# Patient Record
Sex: FEMALE | Race: WHITE | NOT HISPANIC OR LATINO | Employment: OTHER | URBAN - METROPOLITAN AREA
[De-identification: names, ages, dates, MRNs, and addresses within clinical notes are randomized per-mention and may not be internally consistent; named-entity substitution may affect disease eponyms.]

---

## 2023-10-04 ENCOUNTER — LAB (OUTPATIENT)
Dept: LAB | Facility: LAB | Age: 73
End: 2023-10-04
Payer: MEDICARE

## 2023-10-04 DIAGNOSIS — E78.00 PURE HYPERCHOLESTEROLEMIA, UNSPECIFIED: Primary | ICD-10-CM

## 2023-10-04 LAB — LDLC SERPL DIRECT ASSAY-MCNC: 70 MG/DL (ref 0–129)

## 2023-10-04 PROCEDURE — 83721 ASSAY OF BLOOD LIPOPROTEIN: CPT

## 2023-10-04 PROCEDURE — 36415 COLL VENOUS BLD VENIPUNCTURE: CPT

## 2024-04-16 ENCOUNTER — LAB (OUTPATIENT)
Dept: LAB | Facility: LAB | Age: 74
End: 2024-04-16
Payer: MEDICARE

## 2024-04-16 DIAGNOSIS — R73.9 HYPERGLYCEMIA, UNSPECIFIED: ICD-10-CM

## 2024-04-16 DIAGNOSIS — E78.00 PURE HYPERCHOLESTEROLEMIA, UNSPECIFIED: Primary | ICD-10-CM

## 2024-04-16 LAB
ALBUMIN SERPL-MCNC: 4.3 G/DL (ref 3.5–5)
ALP BLD-CCNC: 134 U/L (ref 35–125)
ALT SERPL-CCNC: 56 U/L (ref 5–40)
ANION GAP SERPL CALC-SCNC: 12 MMOL/L
AST SERPL-CCNC: 39 U/L (ref 5–40)
BILIRUB SERPL-MCNC: 1.3 MG/DL (ref 0.1–1.2)
BUN SERPL-MCNC: 11 MG/DL (ref 8–25)
CALCIUM SERPL-MCNC: 9.6 MG/DL (ref 8.5–10.4)
CHLORIDE SERPL-SCNC: 103 MMOL/L (ref 97–107)
CO2 SERPL-SCNC: 25 MMOL/L (ref 24–31)
CREAT SERPL-MCNC: 0.9 MG/DL (ref 0.4–1.6)
EGFRCR SERPLBLD CKD-EPI 2021: 68 ML/MIN/1.73M*2
ERYTHROCYTE [DISTWIDTH] IN BLOOD BY AUTOMATED COUNT: 13 % (ref 11.5–14.5)
EST. AVERAGE GLUCOSE BLD GHB EST-MCNC: 114 MG/DL
GLUCOSE SERPL-MCNC: 96 MG/DL (ref 65–99)
HBA1C MFR BLD: 5.6 %
HCT VFR BLD AUTO: 43.2 % (ref 36–46)
HGB BLD-MCNC: 13.8 G/DL (ref 12–16)
MCH RBC QN AUTO: 27.4 PG (ref 26–34)
MCHC RBC AUTO-ENTMCNC: 31.9 G/DL (ref 32–36)
MCV RBC AUTO: 86 FL (ref 80–100)
NRBC BLD-RTO: 0 /100 WBCS (ref 0–0)
PLATELET # BLD AUTO: 284 X10*3/UL (ref 150–450)
POTASSIUM SERPL-SCNC: 4.5 MMOL/L (ref 3.4–5.1)
PROT SERPL-MCNC: 6.6 G/DL (ref 5.9–7.9)
RBC # BLD AUTO: 5.04 X10*6/UL (ref 4–5.2)
SODIUM SERPL-SCNC: 140 MMOL/L (ref 133–145)
WBC # BLD AUTO: 5.2 X10*3/UL (ref 4.4–11.3)

## 2024-04-16 PROCEDURE — 83036 HEMOGLOBIN GLYCOSYLATED A1C: CPT

## 2024-04-16 PROCEDURE — 80053 COMPREHEN METABOLIC PANEL: CPT

## 2024-04-16 PROCEDURE — 36415 COLL VENOUS BLD VENIPUNCTURE: CPT

## 2024-04-16 PROCEDURE — 85027 COMPLETE CBC AUTOMATED: CPT

## 2024-06-04 ENCOUNTER — OFFICE VISIT (OUTPATIENT)
Dept: PRIMARY CARE | Facility: CLINIC | Age: 74
End: 2024-06-04
Payer: MEDICARE

## 2024-06-04 ENCOUNTER — TELEPHONE (OUTPATIENT)
Dept: PRIMARY CARE | Facility: CLINIC | Age: 74
End: 2024-06-04

## 2024-06-04 VITALS
WEIGHT: 195 LBS | DIASTOLIC BLOOD PRESSURE: 84 MMHG | TEMPERATURE: 99.8 F | BODY MASS INDEX: 30.61 KG/M2 | SYSTOLIC BLOOD PRESSURE: 122 MMHG | HEIGHT: 67 IN | HEART RATE: 82 BPM | OXYGEN SATURATION: 97 %

## 2024-06-04 DIAGNOSIS — Z00.00 ROUTINE ADULT HEALTH MAINTENANCE: Primary | ICD-10-CM

## 2024-06-04 DIAGNOSIS — E55.9 VITAMIN D DEFICIENCY: ICD-10-CM

## 2024-06-04 DIAGNOSIS — I47.10 SVT (SUPRAVENTRICULAR TACHYCARDIA) (CMS-HCC): ICD-10-CM

## 2024-06-04 DIAGNOSIS — E78.2 MIXED HYPERLIPIDEMIA: ICD-10-CM

## 2024-06-04 DIAGNOSIS — I10 PRIMARY HYPERTENSION: ICD-10-CM

## 2024-06-04 DIAGNOSIS — R74.8 ELEVATED LIVER ENZYMES: ICD-10-CM

## 2024-06-04 DIAGNOSIS — Z12.31 BREAST CANCER SCREENING BY MAMMOGRAM: ICD-10-CM

## 2024-06-04 DIAGNOSIS — Z12.11 COLON CANCER SCREENING: ICD-10-CM

## 2024-06-04 PROBLEM — N95.1 MENOPAUSAL SYNDROME: Status: ACTIVE | Noted: 2021-01-25

## 2024-06-04 PROCEDURE — 3074F SYST BP LT 130 MM HG: CPT | Performed by: FAMILY MEDICINE

## 2024-06-04 PROCEDURE — G0439 PPPS, SUBSEQ VISIT: HCPCS | Performed by: FAMILY MEDICINE

## 2024-06-04 PROCEDURE — 1036F TOBACCO NON-USER: CPT | Performed by: FAMILY MEDICINE

## 2024-06-04 PROCEDURE — 3079F DIAST BP 80-89 MM HG: CPT | Performed by: FAMILY MEDICINE

## 2024-06-04 PROCEDURE — 1158F ADVNC CARE PLAN TLK DOCD: CPT | Performed by: FAMILY MEDICINE

## 2024-06-04 PROCEDURE — 1126F AMNT PAIN NOTED NONE PRSNT: CPT | Performed by: FAMILY MEDICINE

## 2024-06-04 PROCEDURE — 1123F ACP DISCUSS/DSCN MKR DOCD: CPT | Performed by: FAMILY MEDICINE

## 2024-06-04 PROCEDURE — 1159F MED LIST DOCD IN RCRD: CPT | Performed by: FAMILY MEDICINE

## 2024-06-04 PROCEDURE — 1160F RVW MEDS BY RX/DR IN RCRD: CPT | Performed by: FAMILY MEDICINE

## 2024-06-04 PROCEDURE — 1157F ADVNC CARE PLAN IN RCRD: CPT | Performed by: FAMILY MEDICINE

## 2024-06-04 PROCEDURE — 99215 OFFICE O/P EST HI 40 MIN: CPT | Performed by: FAMILY MEDICINE

## 2024-06-04 RX ORDER — LOSARTAN POTASSIUM 50 MG/1
75 TABLET ORAL DAILY
COMMUNITY

## 2024-06-04 RX ORDER — NAPROXEN SODIUM 220 MG/1
81 TABLET, FILM COATED ORAL
COMMUNITY
Start: 2022-06-08

## 2024-06-04 RX ORDER — FLECAINIDE ACETATE 50 MG/1
50 TABLET ORAL 2 TIMES DAILY
COMMUNITY

## 2024-06-04 RX ORDER — ATORVASTATIN CALCIUM 80 MG/1
80 TABLET, FILM COATED ORAL NIGHTLY
COMMUNITY
Start: 2024-04-25

## 2024-06-04 ASSESSMENT — PATIENT HEALTH QUESTIONNAIRE - PHQ9
2. FEELING DOWN, DEPRESSED OR HOPELESS: NOT AT ALL
SUM OF ALL RESPONSES TO PHQ9 QUESTIONS 1 AND 2: 0
1. LITTLE INTEREST OR PLEASURE IN DOING THINGS: NOT AT ALL

## 2024-06-04 ASSESSMENT — PAIN SCALES - GENERAL: PAINLEVEL: 0-NO PAIN

## 2024-06-04 NOTE — TELEPHONE ENCOUNTER
At recent visit, I did state that if all remains stable, we will plan to continue with annual well exams.  I do think we should keep an eye on the liver enzyme levels to which I would like patient to have repeat blood work in 3 months.  At that time we can additionally check patient's thyroid level and vitamin D to make sure there are no metabolic elements adding to weight issues and to monitor her vitamin level secondary to listed history of prior deficiency.  Please let patient know about this.  We do not have to have an associated appointment but we will reach out once results come our way in 3 months

## 2024-06-04 NOTE — ASSESSMENT & PLAN NOTE
- Blood pressure stable in office today  -Continue with current regimen along with healthy, balanced diet including moderation of salt/caffeine

## 2024-06-04 NOTE — PROGRESS NOTES
Outpatient Visit Note    Chief Complaint   Patient presents with    Annual Exam       HPI:  Laurence Méndez is a 74 y.o. female who presents to the office as a new patient for an annual Medicare Wellness Visit.    Patient was previously established with Dr. Nina Gonzalez.  Chart review notes that patient was most recently evaluated by podiatry secondary to neuroma.  Did have panel blood work recently completed in April including CBC, CMP and A1c which was remarkable for mildly elevated ALT/alk phos levels.    Well Exam:  Overall, they describes their health as good with no reports of recent illness or hospitalization. They state that their diet is fair though she has struggled with weight loss. No reported issues of chest pain, shortness of breath, headaches, vision/hearing changes, abdominal pain, vomiting, diarrhea, melena, hematochezia, constipation or urinary symptoms.    Does have a history of SVT for which she diligently follows with cardiology.  Does have follow-up scheduled for September.  Has been on a stable regimen of losartan, atorvastatin and flecainide.  Denies any chest pain or shortness of breath.  Does report ongoing apprehensions to be on any other type of medication including over-the-counter's as she is worried about creating arrhythmias.    Preventative Health Maintenance:  In regards to preventative health maintenance, last Tdap received in 2020. Flu shot received for past season. Pneumonia vaccination series previously initiated. In regards to CRC screening, no prior screening on file. Shingles vaccination not pursued to date. COVID-19 vaccination series completed with patient currently due for booster. Last Mammogram completed in June 2023.    Advanced directives: Living will//POA    Hearing screen: reports no difficulty with hearing and passes finger rub test bilaterally    Does the patient use opioid medications: No  Name of medication: N/A  If yes, do they take this medicine  appropriately: N/A    How does the patient rate their health status today: good     Cognitive Screen:  AAAx3  to person, place and time: Yes  3 word recall: Apple, Car, Shoe - Immediate recall: Yes        - 5 minutes recall: Yes  Impression: No cognitive deficiency observed during screening or encounter today      Reviewed:   Past Medical History/Allergies:  Yes  Family History:  Yes  Social History:  Yes  Current Medications:  Yes  Vital Signs:  Yes  Advanced Directives:  discussed  Immunizations:  reviewed today  Home Safety:                    Up & Go test > 30 seconds?  No                   Home have rugs; lack grab bars in bathroom; lack handrail on stairs; have poor lighting?  No                   Hearing difficulties?  No  Geriatric Assessment                   ADL areas requiring assistance:  Does not need help with Dressing, Eating, Ambulating, Toileting, Grooming, Hygiene.                    IADL areas requiring assistance:  Does not need help with Shopping, Housework, Accounting, Transportation, Driving.   Medications: reviewed  Current supplements:  Reviewed and recorded.   Other providers: Reviewed and recorded - Current providers and suppliers: Dr. Carreon.       Past Medical History:   Diagnosis Date    Hyperlipidemia     Hypertension         Current Medications  Current Outpatient Medications   Medication Instructions    aspirin 81 mg    atorvastatin (LIPITOR) 80 mg, oral, Nightly    flecainide (TAMBOCOR) 50 mg, oral, 2 times daily    Lactobacil 2/Bifido 11/S.therm (HIGH POTENCY PROBIOTIC ORAL) oral    losartan (COZAAR) 75 mg, oral, Daily        Allergies  Allergies   Allergen Reactions    Penicillins Hives and Swelling    Sulfa (Sulfonamide Antibiotics) Angioedema and Swelling    Diltiazem Swelling    Iodine Other     Heat flushing, pt states her blood was affected    Sulfamethoxazole-Trimethoprim Swelling    Heparin Rash        Immunizations  Immunization History   Administered Date(s)  Administered    Flu vaccine (IIV4), preservative free *Check age/dose* 09/02/2020    Flu vaccine, quadrivalent, high-dose, preservative free, age 65y+ (FLUZONE) 09/12/2022    Influenza, Seasonal, Quadrivalent, Adjuvanted 09/07/2021, 10/10/2023    Influenza, seasonal, injectable 09/02/2020    Moderna SARS-CoV-2 Vaccination 03/02/2021, 03/30/2021    Pfizer Purple Cap SARS-CoV-2 12/01/2021    Pneumococcal conjugate vaccine, 13-valent (PREVNAR 13) 09/09/2020, 09/09/2020    Pneumococcal polysaccharide vaccine, 23-valent, age 2 years and older (PNEUMOVAX 23) 09/27/2021    Tdap vaccine, age 7 year and older (BOOSTRIX, ADACEL) 07/06/2020        Past Surgical History:   Procedure Laterality Date    APPENDECTOMY      HYSTERECTOMY      TONSILLECTOMY       Family History   Problem Relation Name Age of Onset    Lung cancer Mother      Breast cancer Sister      Other (pulmonary arterial hypertension) Sister       Social History     Tobacco Use    Smoking status: Never    Smokeless tobacco: Never   Vaping Use    Vaping status: Never Used   Substance Use Topics    Alcohol use: Never    Drug use: Never     Tobacco Use: Low Risk  (6/4/2024)    Patient History     Smoking Tobacco Use: Never     Smokeless Tobacco Use: Never     Passive Exposure: Not on file        ROS  All pertinent positive symptoms are included in the history of present illness.  All other systems have been reviewed and are negative and noncontributory to this patient's current ailments.    VITAL SIGNS  Vitals:    06/04/24 1411   BP: 122/84   Pulse: 82   Temp: 37.7 °C (99.8 °F)   SpO2: 97%     Vitals:    06/04/24 1411   Weight: 88.5 kg (195 lb)      Body mass index is 30.54 kg/m².     PHYSICAL EXAM  GENERAL APPEARANCE: well nourished, well developed, looks like stated age, in no acute distress, not ill or tired appearing, conversing well.   HEENT: no trauma, normocephalic. PERRLA and EOMI with normal external exam. TM's intact with no injection or effusion, no signs  of infection. Nares patent, turbinates pink without discharge. Pharynx pink with no exudates or lesions, no enlarged tonsils.   NECK: no nodes, supple without rigidity, no neck mass was observed, no thyromegaly or thyroid nodules.   HEART: regular rate and rhythm, S1 and S2 heard with no murmurs or skipped beats  LUNGS: clear to auscultation bilaterally with no wheezes, crackles or rales.   ABDOMEN: no organomegaly, soft, nontender, nondistended, no guarding/rebound/rigidity.   EXTREMITIES: moving all extremities equally with no edema or deformities.   SKIN: normal skin color and pigmentation, normal skin turgor without rash, lesions, or nodules visualized.   NEUROLOGIC EXAM: CN II-XII grossly intact, normal gait, normal balance, 5/5 muscle strength, sensation grossly intact.   PSYCH: mood and affect appropriate; alert and oriented to time, place, person; no difficulty with speech or language.     Preventative Services reviewed with patient and copy printed for patient.    Assessment/Plan   Problem List Items Addressed This Visit             ICD-10-CM    HTN (hypertension) I10     - Blood pressure stable in office today  -Continue with current regimen along with healthy, balanced diet including moderation of salt/caffeine         Relevant Orders    Comprehensive metabolic panel    Tsh With Reflex To Free T4 If Abnormal    Mixed hyperlipidemia E78.2     - Continue with atorvastatin regimen along with healthy, low-fat diet         Relevant Orders    Comprehensive metabolic panel    Tsh With Reflex To Free T4 If Abnormal    SVT (supraventricular tachycardia) (CMS-HCC) I47.10     - Continue with routine cardiology follow-up per protocol and compliance with active regimen         Vitamin D deficiency E55.9    Relevant Orders    Vitamin D 25-Hydroxy,Total (for eval of Vitamin D levels)    Elevated liver enzymes R74.8     - Recommend monitoring moderately elevated liver enzymes with repeat blood work in 3 to 6 months          Relevant Orders    Comprehensive metabolic panel    Tsh With Reflex To Free T4 If Abnormal     Other Visit Diagnoses         Codes    Routine adult health maintenance    -  Primary Z00.00    Relevant Orders    BI mammo bilateral screening tomosynthesis    Cologuard® colon cancer screening    Comprehensive metabolic panel    Tsh With Reflex To Free T4 If Abnormal    Breast cancer screening by mammogram     Z12.31    Relevant Orders    BI mammo bilateral screening tomosynthesis    Colon cancer screening     Z12.11    Relevant Orders    Cologuard® colon cancer screening            Additional Visit Plans:  Notes:  PREVENTATIVE HEALTH SCREENINGS INCLUDED:  - Blood pressure screen.  - Blood work may include a cholesterol and diabetes screen if risk factors exist (overweight, high blood pressure etc); screening for sexually transmitted infections; a one time HIV screen for all individuals, and a one time Hepatitis C Virus screen for those born between 3737-1358.  - I encourage you to eat a low-fat, moderate-carbohydrate, low-calorie diet to maintain a normal BMI (under 25) to reduce heart disease, and risk for diabetes  - Moderate intensity exercise for 30 minutes 5 days per week is recommended  - Along with recommendations for nutrition and exercise discussed today helpful resources recommended by the American Academy of Family Practice can be found at www.familydoctor.org or www.choosemyplate.gov    - Colon cancer screening for all ages 45-75 or 85 years old periodically depending on results.  - Cervical cancer screening (pap test) in women between 21-65 years old, periodically depending on results.  - Mammogram screening for breast cancer in women starting at 40-50 years and every 1-2 years.  - For men and women who have a 30 pack year smoking history and currently smoke or have quit in the past 15 years, screening for lung cancer with a yearly low dose CT scan is recommended starting at age 55 until age 80 years  -  For men only who have smoked 100+ cigarettes anytime in their lifetime, a one time ultrasound screening for for abdominal aortic aneurysms starting at age 65 until 75 years old  - Bone density screening (DEXA) for osteoporosis in women aged 65 years and older, once every two years if needed.    IMMUNIZATIONS:  - Flu shot annually.  - Tetanus booster every 10 years.  - Two pneumococcal vaccinations after 65 years old.  - Shingles vaccine for those 50 years or older.     This was a shared decision making visit.    Next Wellness Exam Due  In 1 year from today    Counseling:       Medication education:         Education:  The patient is counseled regarding potential side-effects of all new medications        Understanding:  Patient expressed understanding        Adherence:  No barriers to adherence identified    ** Please excuse any errors in grammar or translation related to this dictation. Voice recognition software was utilized to prepare this document. **

## 2024-06-04 NOTE — PATIENT INSTRUCTIONS
Problem List Items Addressed This Visit             ICD-10-CM    HTN (hypertension) I10     - Blood pressure stable in office today  -Continue with current regimen along with healthy, balanced diet including moderation of salt/caffeine         Relevant Orders    Comprehensive metabolic panel    Tsh With Reflex To Free T4 If Abnormal    Mixed hyperlipidemia E78.2     - Continue with atorvastatin regimen along with healthy, low-fat diet         Relevant Orders    Comprehensive metabolic panel    Tsh With Reflex To Free T4 If Abnormal    SVT (supraventricular tachycardia) (CMS-HCC) I47.10     - Continue with routine cardiology follow-up per protocol and compliance with active regimen         Vitamin D deficiency E55.9    Relevant Orders    Vitamin D 25-Hydroxy,Total (for eval of Vitamin D levels)    Elevated liver enzymes R74.8     - Recommend monitoring moderately elevated liver enzymes with repeat blood work in 3 to 6 months         Relevant Orders    Comprehensive metabolic panel    Tsh With Reflex To Free T4 If Abnormal     Other Visit Diagnoses         Codes    Routine adult health maintenance    -  Primary Z00.00    Relevant Orders    BI mammo bilateral screening tomosynthesis    Cologuard® colon cancer screening    Comprehensive metabolic panel    Tsh With Reflex To Free T4 If Abnormal    Breast cancer screening by mammogram     Z12.31    Relevant Orders    BI mammo bilateral screening tomosynthesis    Colon cancer screening     Z12.11    Relevant Orders    Cologuard® colon cancer screening            Additional Visit Plans:  Notes:  PREVENTATIVE HEALTH SCREENINGS INCLUDED:  - Blood pressure screen.  - Blood work may include a cholesterol and diabetes screen if risk factors exist (overweight, high blood pressure etc); screening for sexually transmitted infections; a one time HIV screen for all individuals, and a one time Hepatitis C Virus screen for those born between 6115-0041.  - I encourage you to eat a  low-fat, moderate-carbohydrate, low-calorie diet to maintain a normal BMI (under 25) to reduce heart disease, and risk for diabetes  - Moderate intensity exercise for 30 minutes 5 days per week is recommended  - Along with recommendations for nutrition and exercise discussed today helpful resources recommended by the American Academy of Family Practice can be found at www.familydoctor.org or www.choosemyplate.gov    - Colon cancer screening for all ages 45-75 or 85 years old periodically depending on results.  - Cervical cancer screening (pap test) in women between 21-65 years old, periodically depending on results.  - Mammogram screening for breast cancer in women starting at 40-50 years and every 1-2 years.  - For men and women who have a 30 pack year smoking history and currently smoke or have quit in the past 15 years, screening for lung cancer with a yearly low dose CT scan is recommended starting at age 55 until age 80 years  - For men only who have smoked 100+ cigarettes anytime in their lifetime, a one time ultrasound screening for for abdominal aortic aneurysms starting at age 65 until 75 years old  - Bone density screening (DEXA) for osteoporosis in women aged 65 years and older, once every two years if needed.    IMMUNIZATIONS:  - Flu shot annually.  - Tetanus booster every 10 years.  - Two pneumococcal vaccinations after 65 years old.  - Shingles vaccine for those 50 years or older.     This was a shared decision making visit.    Next Wellness Exam Due  In 1 year from today    Counseling:       Medication education:         Education:  The patient is counseled regarding potential side-effects of all new medications        Understanding:  Patient expressed understanding        Adherence:  No barriers to adherence identified    ** Please excuse any errors in grammar or translation related to this dictation. Voice recognition software was utilized to prepare this document. **

## 2024-06-07 NOTE — TELEPHONE ENCOUNTER
Laurence called back this morning. She was relayed the message about the BW. She is aware and will have her levels checked. However, she mentioned that her Cardiologist had taken her off everything that she was taking over the counter. She also mentioned that her Vit D had shot up to 2000 and that was why she was also taken off that medication. She is not on any diet plan at this time, that was something that had been suggested from Dr. Nina Gonzalez office.

## 2024-06-18 LAB — NONINV COLON CA DNA+OCC BLD SCRN STL QL: NEGATIVE

## 2024-07-01 ENCOUNTER — APPOINTMENT (OUTPATIENT)
Dept: PRIMARY CARE | Facility: CLINIC | Age: 74
End: 2024-07-01
Payer: MEDICARE

## 2024-07-02 ENCOUNTER — HOSPITAL ENCOUNTER (OUTPATIENT)
Dept: RADIOLOGY | Facility: CLINIC | Age: 74
Discharge: HOME | End: 2024-07-02
Payer: MEDICARE

## 2024-07-02 DIAGNOSIS — Z12.31 BREAST CANCER SCREENING BY MAMMOGRAM: ICD-10-CM

## 2024-07-02 DIAGNOSIS — Z00.00 ROUTINE ADULT HEALTH MAINTENANCE: ICD-10-CM

## 2024-07-02 PROCEDURE — 77067 SCR MAMMO BI INCL CAD: CPT

## 2024-07-02 PROCEDURE — 77067 SCR MAMMO BI INCL CAD: CPT | Performed by: RADIOLOGY

## 2024-07-02 PROCEDURE — 77063 BREAST TOMOSYNTHESIS BI: CPT | Performed by: RADIOLOGY

## 2024-08-06 ENCOUNTER — LAB (OUTPATIENT)
Dept: LAB | Facility: LAB | Age: 74
End: 2024-08-06
Payer: MEDICARE

## 2024-09-06 ENCOUNTER — LAB (OUTPATIENT)
Dept: LAB | Facility: LAB | Age: 74
End: 2024-09-06
Payer: MEDICARE

## 2024-09-06 DIAGNOSIS — E78.2 MIXED HYPERLIPIDEMIA: ICD-10-CM

## 2024-09-06 DIAGNOSIS — Z00.00 ROUTINE ADULT HEALTH MAINTENANCE: ICD-10-CM

## 2024-09-06 DIAGNOSIS — R74.8 ELEVATED LIVER ENZYMES: ICD-10-CM

## 2024-09-06 DIAGNOSIS — I10 PRIMARY HYPERTENSION: ICD-10-CM

## 2024-09-06 DIAGNOSIS — E55.9 VITAMIN D DEFICIENCY: ICD-10-CM

## 2024-09-06 LAB
25(OH)D3 SERPL-MCNC: 31 NG/ML (ref 31–100)
ALBUMIN SERPL-MCNC: 4.2 G/DL (ref 3.5–5)
ALP BLD-CCNC: 100 U/L (ref 35–125)
ALT SERPL-CCNC: 33 U/L (ref 5–40)
ANION GAP SERPL CALC-SCNC: 10 MMOL/L
AST SERPL-CCNC: 31 U/L (ref 5–40)
BILIRUB SERPL-MCNC: 1.4 MG/DL (ref 0.1–1.2)
BUN SERPL-MCNC: 11 MG/DL (ref 8–25)
CALCIUM SERPL-MCNC: 9.6 MG/DL (ref 8.5–10.4)
CHLORIDE SERPL-SCNC: 102 MMOL/L (ref 97–107)
CO2 SERPL-SCNC: 28 MMOL/L (ref 24–31)
CREAT SERPL-MCNC: 0.9 MG/DL (ref 0.4–1.6)
EGFRCR SERPLBLD CKD-EPI 2021: 68 ML/MIN/1.73M*2
GLUCOSE SERPL-MCNC: 103 MG/DL (ref 65–99)
POTASSIUM SERPL-SCNC: 4.8 MMOL/L (ref 3.4–5.1)
PROT SERPL-MCNC: 6.6 G/DL (ref 5.9–7.9)
SODIUM SERPL-SCNC: 140 MMOL/L (ref 133–145)
TSH SERPL DL<=0.05 MIU/L-ACNC: 2.13 MIU/L (ref 0.27–4.2)

## 2024-09-06 PROCEDURE — 82306 VITAMIN D 25 HYDROXY: CPT

## 2024-09-06 PROCEDURE — 84443 ASSAY THYROID STIM HORMONE: CPT

## 2024-09-06 PROCEDURE — 80053 COMPREHEN METABOLIC PANEL: CPT

## 2024-09-06 PROCEDURE — 36415 COLL VENOUS BLD VENIPUNCTURE: CPT

## 2024-09-10 ENCOUNTER — LAB (OUTPATIENT)
Dept: LAB | Facility: LAB | Age: 74
End: 2024-09-10
Payer: MEDICARE

## 2024-09-10 DIAGNOSIS — E78.00 PURE HYPERCHOLESTEROLEMIA, UNSPECIFIED: Primary | ICD-10-CM

## 2024-10-02 ENCOUNTER — LAB (OUTPATIENT)
Dept: LAB | Facility: LAB | Age: 74
End: 2024-10-02
Payer: MEDICARE

## 2024-10-02 DIAGNOSIS — E78.00 PURE HYPERCHOLESTEROLEMIA, UNSPECIFIED: ICD-10-CM

## 2024-10-02 PROCEDURE — 83721 ASSAY OF BLOOD LIPOPROTEIN: CPT

## 2024-10-02 PROCEDURE — 36415 COLL VENOUS BLD VENIPUNCTURE: CPT

## 2024-10-03 LAB — LDLC SERPL DIRECT ASSAY-MCNC: 59 MG/DL (ref 0–129)

## 2024-11-19 ENCOUNTER — OFFICE VISIT (OUTPATIENT)
Dept: PRIMARY CARE | Facility: CLINIC | Age: 74
End: 2024-11-19
Payer: MEDICARE

## 2024-11-19 VITALS
HEIGHT: 67 IN | HEART RATE: 65 BPM | SYSTOLIC BLOOD PRESSURE: 150 MMHG | BODY MASS INDEX: 29.66 KG/M2 | TEMPERATURE: 96.6 F | DIASTOLIC BLOOD PRESSURE: 80 MMHG | OXYGEN SATURATION: 98 % | WEIGHT: 189 LBS

## 2024-11-19 DIAGNOSIS — S39.012A STRAIN OF LUMBAR REGION, INITIAL ENCOUNTER: Primary | ICD-10-CM

## 2024-11-19 PROCEDURE — 3077F SYST BP >= 140 MM HG: CPT | Performed by: FAMILY MEDICINE

## 2024-11-19 PROCEDURE — 99214 OFFICE O/P EST MOD 30 MIN: CPT | Performed by: FAMILY MEDICINE

## 2024-11-19 PROCEDURE — 3079F DIAST BP 80-89 MM HG: CPT | Performed by: FAMILY MEDICINE

## 2024-11-19 PROCEDURE — 1123F ACP DISCUSS/DSCN MKR DOCD: CPT | Performed by: FAMILY MEDICINE

## 2024-11-19 PROCEDURE — 1125F AMNT PAIN NOTED PAIN PRSNT: CPT | Performed by: FAMILY MEDICINE

## 2024-11-19 PROCEDURE — 3008F BODY MASS INDEX DOCD: CPT | Performed by: FAMILY MEDICINE

## 2024-11-19 PROCEDURE — 1036F TOBACCO NON-USER: CPT | Performed by: FAMILY MEDICINE

## 2024-11-19 PROCEDURE — 1159F MED LIST DOCD IN RCRD: CPT | Performed by: FAMILY MEDICINE

## 2024-11-19 PROCEDURE — 1157F ADVNC CARE PLAN IN RCRD: CPT | Performed by: FAMILY MEDICINE

## 2024-11-19 PROCEDURE — 1158F ADVNC CARE PLAN TLK DOCD: CPT | Performed by: FAMILY MEDICINE

## 2024-11-19 PROCEDURE — 1160F RVW MEDS BY RX/DR IN RCRD: CPT | Performed by: FAMILY MEDICINE

## 2024-11-19 RX ORDER — CHOLECALCIFEROL (VITAMIN D3) 50 MCG
50 TABLET ORAL DAILY
COMMUNITY

## 2024-11-19 RX ORDER — METHYLPREDNISOLONE 4 MG/1
TABLET ORAL
Qty: 21 TABLET | Refills: 0 | Status: SHIPPED | OUTPATIENT
Start: 2024-11-19 | End: 2024-11-26

## 2024-11-19 RX ORDER — METAXALONE 800 MG/1
800 TABLET ORAL 3 TIMES DAILY PRN
Qty: 12 TABLET | Refills: 0 | Status: SHIPPED | OUTPATIENT
Start: 2024-11-19

## 2024-11-19 ASSESSMENT — PATIENT HEALTH QUESTIONNAIRE - PHQ9
2. FEELING DOWN, DEPRESSED OR HOPELESS: NOT AT ALL
1. LITTLE INTEREST OR PLEASURE IN DOING THINGS: NOT AT ALL
SUM OF ALL RESPONSES TO PHQ9 QUESTIONS 1 AND 2: 0

## 2024-11-19 ASSESSMENT — PAIN SCALES - GENERAL: PAINLEVEL_OUTOF10: 8

## 2024-11-19 NOTE — PATIENT INSTRUCTIONS
Problem List Items Addressed This Visit             ICD-10-CM    Strain of lumbar region - Primary S39.012A     - symptoms seem consistent with acute paraspinal back muscle strain with associated spasm  - supportive care advocated in the form of rest, gentle stretching, ice/heat and anti-inflammatory therapy  - will attempt to optimize anti-inflammatory therapy utilizing a structured steroid pack; please take with food as directed  - while on steroid pack, please avoid additional over-the-counter anti-inflammatory such as ibuprofen/Advil/Aleve/Motrin as too much anti-inflammatory medication can irritate stomach  - if additional pain relief is needed, it is safe to take acetaminophen/Tylenol 500-1000 mg every 6 hours as needed with food  -Will relaxant additionally sent for as needed use with caution to use while when settled in as it is not advised to use while driving or operating machinery as it can make people sleepy  - if symptoms persist, please contact office and we can coordinate for further evaluation including possible imaging and/or physical therapy assessment         Relevant Medications    methylPREDNISolone (Medrol Dospak) 4 mg tablets    metaxalone (Skelaxin) 800 mg tablet       Counseling:       Medication education:         Education:  The patient is counseled regarding potential side-effects of all new medications        Understanding:  Patient expressed understanding        Adherence:  No barriers to adherence identified    ** Please excuse any errors in grammar or translation related to this dictation. Voice recognition software was utilized to prepare this document. **

## 2024-11-19 NOTE — ASSESSMENT & PLAN NOTE
- symptoms seem consistent with acute paraspinal back muscle strain with associated spasm  - supportive care advocated in the form of rest, gentle stretching, ice/heat and anti-inflammatory therapy  - will attempt to optimize anti-inflammatory therapy utilizing a structured steroid pack; please take with food as directed  - while on steroid pack, please avoid additional over-the-counter anti-inflammatory such as ibuprofen/Advil/Aleve/Motrin as too much anti-inflammatory medication can irritate stomach  - if additional pain relief is needed, it is safe to take acetaminophen/Tylenol 500-1000 mg every 6 hours as needed with food  -Will relaxant additionally sent for as needed use with caution to use while when settled in as it is not advised to use while driving or operating machinery as it can make people sleepy  - if symptoms persist, please contact office and we can coordinate for further evaluation including possible imaging and/or physical therapy assessment

## 2024-11-19 NOTE — PROGRESS NOTES
Outpatient Visit Note    Chief Complaint   Patient presents with    Back Pain     Back pain x2 weeks. No known injury. Tried tylenol, ice, heat, biofreeze, stretching with no relief         HPI:  Laurence Méndez is a 74 y.o. female presents the office secondary to complaints of acute back pain.  She was last seen on 6/4/24 as a new patient for an annual Medicare Wellness Visit.    Patient was previously established with Dr. Nina Gonzalez.  Chart review notes that patient was most recently evaluated by podiatry secondary to neuroma.  Did have panel blood work recently completed in April including CBC, CMP and A1c which was remarkable for mildly elevated ALT/alk phos levels.    She reports approximately 2 weeks of focal lumbar pain which has been midline with occasional discomfort in her right lower back.  States that symptoms started after carrying her granddaughter.  Has attempted to manage with Tylenol, ice/heat, Biofreeze and stretching with no significant relief.  Denies any radiation of pain into lower extremities with no leg weakness, saddle anesthesia or bowel/bladder dysfunction.    Advanced directives: Living will//POA    Current Medications  Current Outpatient Medications   Medication Instructions    aspirin 81 mg    atorvastatin (LIPITOR) 80 mg, Nightly    cholecalciferol (VITAMIN D3) 50 mcg, Daily    flecainide (TAMBOCOR) 50 mg, 2 times daily    Lactobacil 2/Bifido 11/S.therm (HIGH POTENCY PROBIOTIC ORAL) Take by mouth.    losartan (COZAAR) 75 mg, Daily    metaxalone (SKELAXIN) 800 mg, oral, 3 times daily PRN    methylPREDNISolone (Medrol Dospak) 4 mg tablets Take as directed on package.        Allergies  Allergies   Allergen Reactions    Penicillins Hives and Swelling    Sulfa (Sulfonamide Antibiotics) Angioedema and Swelling    Diltiazem Swelling    Iodine Other     Heat flushing, pt states her blood was affected    Sulfamethoxazole-Trimethoprim Swelling    Heparin Rash        Past Medical  History:   Diagnosis Date    Hyperlipidemia     Hypertension       Past Surgical History:   Procedure Laterality Date    APPENDECTOMY      HYSTERECTOMY      TONSILLECTOMY       Family History   Problem Relation Name Age of Onset    Lung cancer Mother      Breast cancer Sister  40    Other (pulmonary arterial hypertension) Sister       Social History     Tobacco Use    Smoking status: Never    Smokeless tobacco: Never   Vaping Use    Vaping status: Never Used   Substance Use Topics    Alcohol use: Never    Drug use: Never       ROS  All pertinent positive symptoms are included in the history of present illness.  All other systems have been reviewed and are negative and noncontributory to this patient's current ailments.    VITAL SIGNS  Vitals:    11/19/24 1445   BP: 150/80   Pulse: 65   Temp: 35.9 °C (96.6 °F)   SpO2: 98%       PHYSICAL EXAM  GENERAL APPEARANCE: alert and oriented, Pleasant and cooperative, No Acute Distress  HEENT: EOMI, PERRLA, MMM  EXTREMITIES: no edema  SKIN: normal, no rash, unremarkable  NEUROLOGIC EXAM: non-focal exam  MUSCULOSKELETAL: Right lumbar paraspinal TTP with reduced axial flexion/extension and rotational motion secondary to pain  PSYCH: affect is normal, eye contact is good      Assessment/Plan   Problem List Items Addressed This Visit             ICD-10-CM    Strain of lumbar region - Primary S39.012A     - symptoms seem consistent with acute paraspinal back muscle strain with associated spasm  - supportive care advocated in the form of rest, gentle stretching, ice/heat and anti-inflammatory therapy  - will attempt to optimize anti-inflammatory therapy utilizing a structured steroid pack; please take with food as directed  - while on steroid pack, please avoid additional over-the-counter anti-inflammatory such as ibuprofen/Advil/Aleve/Motrin as too much anti-inflammatory medication can irritate stomach  - if additional pain relief is needed, it is safe to take acetaminophen/Tylenol  500-1000 mg every 6 hours as needed with food  -Will relaxant additionally sent for as needed use with caution to use while when settled in as it is not advised to use while driving or operating machinery as it can make people sleepy  - if symptoms persist, please contact office and we can coordinate for further evaluation including possible imaging and/or physical therapy assessment         Relevant Medications    methylPREDNISolone (Medrol Dospak) 4 mg tablets    metaxalone (Skelaxin) 800 mg tablet       Counseling:       Medication education:         Education:  The patient is counseled regarding potential side-effects of all new medications        Understanding:  Patient expressed understanding        Adherence:  No barriers to adherence identified    ** Please excuse any errors in grammar or translation related to this dictation. Voice recognition software was utilized to prepare this document. **

## 2025-05-12 ENCOUNTER — OFFICE VISIT (OUTPATIENT)
Dept: URGENT CARE | Age: 75
End: 2025-05-12
Payer: MEDICARE

## 2025-05-12 VITALS
SYSTOLIC BLOOD PRESSURE: 129 MMHG | DIASTOLIC BLOOD PRESSURE: 71 MMHG | OXYGEN SATURATION: 99 % | HEART RATE: 84 BPM | RESPIRATION RATE: 18 BRPM | TEMPERATURE: 97.7 F

## 2025-05-12 DIAGNOSIS — L30.9 DERMATITIS: Primary | ICD-10-CM

## 2025-05-12 PROCEDURE — 99203 OFFICE O/P NEW LOW 30 MIN: CPT | Performed by: PHYSICIAN ASSISTANT

## 2025-05-12 PROCEDURE — 1157F ADVNC CARE PLAN IN RCRD: CPT | Performed by: PHYSICIAN ASSISTANT

## 2025-05-12 PROCEDURE — 1036F TOBACCO NON-USER: CPT | Performed by: PHYSICIAN ASSISTANT

## 2025-05-12 PROCEDURE — 1159F MED LIST DOCD IN RCRD: CPT | Performed by: PHYSICIAN ASSISTANT

## 2025-05-12 PROCEDURE — 3078F DIAST BP <80 MM HG: CPT | Performed by: PHYSICIAN ASSISTANT

## 2025-05-12 PROCEDURE — G8433 SCR FOR DEP NOT CPT DOC RSN: HCPCS | Performed by: PHYSICIAN ASSISTANT

## 2025-05-12 PROCEDURE — 3074F SYST BP LT 130 MM HG: CPT | Performed by: PHYSICIAN ASSISTANT

## 2025-05-12 RX ORDER — CEPHALEXIN 500 MG/1
500 CAPSULE ORAL 3 TIMES DAILY
Qty: 21 CAPSULE | Refills: 0 | Status: SHIPPED | OUTPATIENT
Start: 2025-05-12 | End: 2025-05-19

## 2025-05-12 NOTE — PROGRESS NOTES
Subjective   Patient ID: Laurence Méndez is a 75 y.o. female. They present today with a chief complaint of Eye Problem (Redness under left eye x yesterday after doing yard work ).    History of Present Illness  Patient is a very pleasant 75-year-old white female, past medical history of SVT on flecainide, hypertension, presented to clinic for chief complaint of skin irritation beneath her left eye.  Patient states she noticed some red skin beneath her left eye yesterday after she was doing some gardening and spreading mulch.  She denies any associated pain or itching.  She denies any double or blurry vision.  Denies any eye pain, double or blurry vision, light sensitivity or photophobia.  Denies any pain with extraocular movements.  Denies any foreign body or trauma to the eye.  States she woke up this morning and states the redness was a little worse and therefore reported to clinic for further evaluation and assessment.  Has not tried anything at home for symptoms.  No further complaints at this time.      Eye Problem      Past Medical History  Allergies as of 05/12/2025 - Reviewed 05/12/2025   Allergen Reaction Noted    Penicillins Hives and Swelling 09/15/2016    Sulfa (sulfonamide antibiotics) Angioedema and Swelling 04/08/2019    Diltiazem Swelling 09/29/2022    Iodine Other 07/20/2022    Sulfamethoxazole-trimethoprim Swelling 09/15/2016    Heparin Rash 06/01/2022       Prescriptions Prior to Admission[1]       Medical History[2]    Surgical History[3]     reports that she has never smoked. She has never used smokeless tobacco. She reports that she does not drink alcohol and does not use drugs.    Review of Systems  Review of Systems                               Objective    Vitals:    05/12/25 0937   BP: 129/71   Pulse: 84   Resp: 18   Temp: 36.5 °C (97.7 °F)   SpO2: 99%     No LMP recorded. Patient has had a hysterectomy.    Physical Exam  General: Vitals Noted. No distress. Normocephalic.     HEENT: TMs  normal, EOMI, normal conjunctiva, patent nares, Normal OP.  Evaluation of the left eye does reveal some erythema associated with the inferior fold.  There is no tenderness to palpation or associated induration or warmth.  There is no purulent drainage.  Evaluation of the left eye reveals no conjunctival injection with no drainage.  Pupils equal round reactive to light with intact EOMs without nystagmus.    Neck: Supple with no adenopathy.     Cardiac: Regular Rate and Rhythm. No murmur.     Pulmonary: Equal breath sounds bilaterally. No wheezes, rhonchi, or rales.    Abdomen: Soft, non-tender, with normal bowel sounds.     Musculoskeletal: Moves all extremities, no effusion, no edema.     Skin: No obvious rashes.  Procedures    Point of Care Test & Imaging Results from this visit    Imaging  No results found.    Cardiology, Vascular, and Other Imaging  No other imaging results found for the past 2 days      Diagnostic study results (if any) were reviewed by Jordan Belcher PA-C.    Assessment/Plan   Allergies, medications, history, and pertinent labs/EKGs/Imaging reviewed by Jordan Belcher PA-C.     Medical Decision Making   Patient was seen eval in the clinic with complaint of skin irritation beneath her left eye.  On exam patient is nontoxic well-appearing spent comfortably no acute distress.  Vital signs are stable, afebrile.  Chest clear, heart is regular, belly is diffusely soft and nontender peer evaluation of left eye as above most consistent with an irritant dermatitis of the skin associated with the inferior eye fold.  Advise she employ warm moist compress to the area and avoid eye make-up.  Provided Keflex capnography times a day for the next 7 days to initiate as needed if she notices worsening of the eye redness increasing pain or tenderness associated with the area.  Advised to report to the ED if symptoms worsen or any changes in vision occur.  He discharged home at this time.  Reviewed my  impression, plan, strict return to support ED precautions with the patient.  She expresses understanding and agreement with plan of care.    Orders and Diagnoses  Diagnoses and all orders for this visit:  Dermatitis  -     cephalexin (Keflex) 500 mg capsule; Take 1 capsule (500 mg) by mouth 3 times a day for 7 days.        Medical Admin Record      Follow Up Instructions  No follow-ups on file.    Patient disposition: Home    Electronically signed by Jordan Belcher PA-C  10:06 AM         [1] (Not in a hospital admission)  [2]   Past Medical History:  Diagnosis Date    Hyperlipidemia     Hypertension    [3]   Past Surgical History:  Procedure Laterality Date    APPENDECTOMY      HYSTERECTOMY      TONSILLECTOMY

## 2025-06-23 ENCOUNTER — APPOINTMENT (OUTPATIENT)
Facility: CLINIC | Age: 75
End: 2025-06-23
Payer: MEDICARE

## 2025-06-23 VITALS
DIASTOLIC BLOOD PRESSURE: 78 MMHG | SYSTOLIC BLOOD PRESSURE: 126 MMHG | BODY MASS INDEX: 29.98 KG/M2 | WEIGHT: 191 LBS | OXYGEN SATURATION: 99 % | HEART RATE: 74 BPM | HEIGHT: 67 IN

## 2025-06-23 DIAGNOSIS — Z78.0 OSTEOPENIA AFTER MENOPAUSE: ICD-10-CM

## 2025-06-23 DIAGNOSIS — Z13.29 THYROID DISORDER SCREEN: ICD-10-CM

## 2025-06-23 DIAGNOSIS — I10 HYPERTENSION, UNSPECIFIED TYPE: ICD-10-CM

## 2025-06-23 DIAGNOSIS — M85.80 OSTEOPENIA AFTER MENOPAUSE: ICD-10-CM

## 2025-06-23 DIAGNOSIS — L60.3 BRITTLE NAILS: ICD-10-CM

## 2025-06-23 DIAGNOSIS — Z11.59 ENCOUNTER FOR HEPATITIS C SCREENING TEST FOR LOW RISK PATIENT: ICD-10-CM

## 2025-06-23 DIAGNOSIS — Z13.0 SCREENING FOR DISORDER OF BLOOD AND BLOOD-FORMING ORGANS: ICD-10-CM

## 2025-06-23 DIAGNOSIS — R74.8 ELEVATED LIVER ENZYMES: ICD-10-CM

## 2025-06-23 DIAGNOSIS — E78.2 MIXED HYPERLIPIDEMIA: ICD-10-CM

## 2025-06-23 DIAGNOSIS — I47.10 SVT (SUPRAVENTRICULAR TACHYCARDIA): ICD-10-CM

## 2025-06-23 DIAGNOSIS — Z00.00 MEDICARE ANNUAL WELLNESS VISIT, SUBSEQUENT: Primary | ICD-10-CM

## 2025-06-23 DIAGNOSIS — Z13.1 DIABETES MELLITUS SCREENING: ICD-10-CM

## 2025-06-23 DIAGNOSIS — Z12.31 ENCOUNTER FOR SCREENING MAMMOGRAM FOR MALIGNANT NEOPLASM OF BREAST: ICD-10-CM

## 2025-06-23 DIAGNOSIS — E55.9 VITAMIN D DEFICIENCY: ICD-10-CM

## 2025-06-23 PROBLEM — S39.012A STRAIN OF LUMBAR REGION: Status: RESOLVED | Noted: 2024-11-19 | Resolved: 2025-06-23

## 2025-06-23 ASSESSMENT — ACTIVITIES OF DAILY LIVING (ADL)
DRESSING: INDEPENDENT
TAKING_MEDICATION: INDEPENDENT
GROCERY_SHOPPING: INDEPENDENT
MANAGING_FINANCES: INDEPENDENT
BATHING: INDEPENDENT
DOING_HOUSEWORK: INDEPENDENT

## 2025-06-23 ASSESSMENT — PATIENT HEALTH QUESTIONNAIRE - PHQ9
1. LITTLE INTEREST OR PLEASURE IN DOING THINGS: NOT AT ALL
2. FEELING DOWN, DEPRESSED OR HOPELESS: NOT AT ALL
2. FEELING DOWN, DEPRESSED OR HOPELESS: NOT AT ALL
SUM OF ALL RESPONSES TO PHQ9 QUESTIONS 1 AND 2: 0
1. LITTLE INTEREST OR PLEASURE IN DOING THINGS: NOT AT ALL
SUM OF ALL RESPONSES TO PHQ9 QUESTIONS 1 AND 2: 0

## 2025-06-23 NOTE — ASSESSMENT & PLAN NOTE
Labs   Mammogram ordered   Due for shingles series, advised to go to vaccine  Cologuard negative 2024

## 2025-06-23 NOTE — ASSESSMENT & PLAN NOTE
Lipid panel ordered  Consider tapering back on lipitor 80 mg if well controlled, depending on liver enzymes on CMP (hx of elevation)   Continue to monitor  Lipitor filled by cardiology

## 2025-06-23 NOTE — PROGRESS NOTES
Subjective   Patient ID: Laurence Méndez is a 75 y.o. female who presents for Establish Care.    HPI  Sleep: sleeps fine   Diet is well balanced.  Exercises regularly.  Time spent outside: a lot  Due for colon cancer screening. Cologuard negative 2024, next due in 3 years, no fam hx of colon cancers   Mammogram due ordered for this year negative last year, sister with breast cancer   No longer gets cervical cancer screening.  DEXA done in 2023 and showed osteopenia. Repeat it next year to compare.   Vaccines are not up to date; they are due for: shingles series, but declines at this time   Dental exam is up to date.  Vision exam is up to date.    Social:   Caffeine use -  1 cup coffee daily  Tobacco use-       none  Alcohol use- none  Illicit substance use - none    Other concerns addressed today include:   Was seeing Dr. Carreon but he left so she is here to establish care     Does follow with cardiology for SVT  Did say all of her heart problems and arrhythmia (SVT) began after her 3rd covid booster.     Review of Systems  All pertinent positive symptoms are included in the history of present illness.    All other systems have been reviewed and are negative and noncontributory to this patient's current ailments.     Social History     Tobacco Use    Smoking status: Never    Smokeless tobacco: Never   Substance Use Topics    Alcohol use: Never      Surgical History[1]   Allergies[2]     Current Medications[3]     Problem List[4]   Immunization History   Administered Date(s) Administered    Flu vaccine (IIV4), preservative free *Check age/dose* 09/02/2020    Flu vaccine, quadrivalent, high-dose, preservative free, age 65y+ (FLUZONE) 09/12/2022    Flu vaccine, trivalent, preservative free, HIGH-DOSE, age 65y+ (Fluzone) 09/06/2024    Influenza, Seasonal, Quadrivalent, Adjuvanted 09/07/2021, 10/10/2023    Influenza, seasonal, injectable 09/02/2020    Moderna SARS-CoV-2 Vaccination 03/02/2021, 03/30/2021    Pfizer Purple  "Cap SARS-CoV-2 12/01/2021    Pneumococcal conjugate vaccine, 13-valent (PREVNAR 13) 09/09/2020, 09/09/2020    Pneumococcal polysaccharide vaccine, 23-valent, age 2 years and older (PNEUMOVAX 23) 09/27/2021    Tdap vaccine, age 7 year and older (BOOSTRIX, ADACEL) 07/06/2020    Varicella vaccine, subcutaneous (VARIVAX) 06/01/2014       Objective     VITALS  /78   Pulse 74   Ht 1.702 m (5' 7\")   Wt 86.6 kg (191 lb)   SpO2 99%   BMI 29.91 kg/m²      Physical Exam  CONSTITUTIONAL - well nourished, well developed, looks like stated age, in no acute distress, not ill-appearing, and not tired appearing  SKIN - normal skin color and pigmentation, normal skin turgor without rash, lesions, or nodules visualized  HEAD - no trauma, normocephalic  EYES - pupils are equal and reactive to light, extraocular muscles are intact, and normal external exam  ENT -no injection, no signs of infection, uvula midline, normal tongue movement and throat normal, no exudate, nasal passage without discharge and patent  NECK - supple without rigidity, no neck mass was observed, no thyromegaly or thyroid nodules  RESPIRATORY - clear to auscultation, no wheezing, no crackles and no rales, good effort  CARDIAC - regular rate and regular rhythm, no skipped beats, no murmur  ABDOMEN - no organomegaly, soft, nontender, nondistended  EXTREMITIES - no edema, no deformities  NEUROLOGICAL - normal gait, normal balance, normal motor, no ataxia, alert, oriented and no focal signs  PSYCHIATRIC - alert, pleasant and cordial, age-appropriate  IMMUNOLOGIC - no cervical lymphadenopathy     No results found for this or any previous visit (from the past 12 weeks).     Assessment/Plan   Problem List Items Addressed This Visit           ICD-10-CM    HTN (hypertension) I10    Well controlled in office  Continue current med regimen  Meds filled by cardiology          Relevant Orders    TSH with reflex to Free T4 if abnormal    Vitamin B12    Magnesium    " Mixed hyperlipidemia E78.2    Lipid panel ordered  Consider tapering back on lipitor 80 mg if well controlled, depending on liver enzymes on CMP (hx of elevation)   Continue to monitor  Lipitor filled by cardiology          Relevant Orders    Lipid Panel    SVT (supraventricular tachycardia) I47.10    Continue current med regimen  Follows with cardiology for this          Relevant Orders    TSH with reflex to Free T4 if abnormal    Vitamin D deficiency E55.9    Labs   Continue supplement   Monitor          Relevant Orders    Vitamin D 25-Hydroxy,Total (for eval of Vitamin D levels)    Vitamin B12    Magnesium    Elevated liver enzymes R74.8    Repeat labs  Consider tapering back on lipitor 80   Monitor   Hep C screen as well          Relevant Orders    Comprehensive Metabolic Panel    Medicare annual wellness visit, subsequent - Primary Z00.00    Labs   Mammogram ordered   Due for shingles series, advised to go to vaccine  Cologuard negative 2024            Brittle nails L60.3    Vitamin levels ordered  Consider taking zinc supplementation           Relevant Orders    Vitamin B12    Magnesium    Osteopenia after menopause M85.80, Z78.0    DEXA UTD   Repeat next year  Monitor   Monitor Vitamin D status          Relevant Orders    CBC     Other Visit Diagnoses         Codes      Encounter for hepatitis C screening test for low risk patient     Z11.59    Relevant Orders    Hepatitis C antibody      Diabetes mellitus screening     Z13.1    Relevant Orders    Hemoglobin A1C      Thyroid disorder screen     Z13.29    Relevant Orders    TSH with reflex to Free T4 if abnormal      Screening for disorder of blood and blood-forming organs     Z13.0    Relevant Orders    CBC      Encounter for screening mammogram for malignant neoplasm of breast     Z12.31    Relevant Orders    BI mammo bilateral screening tomosynthesis          Please return to clinic in 6 months for follow up.     I have personally reviewed all available  pertinent labs, imaging, and consult notes with the patient.      All questions and concerns were addressed. Patient verbalizes understanding instructions and agrees with established plan of care.      Patient seen and discussed with Dr. Srikanth Bellamy DO, MA   PGY-1 [facility]         [1]   Past Surgical History:  Procedure Laterality Date    APPENDECTOMY      HYSTERECTOMY      TONSILLECTOMY     [2]   Allergies  Allergen Reactions    Penicillins Hives and Swelling    Sulfa (Sulfonamide Antibiotics) Angioedema and Swelling    Diltiazem Swelling    Iodine Other     Heat flushing, pt states her blood was affected    Sulfamethoxazole-Trimethoprim Swelling    Heparin Rash   [3]   Current Outpatient Medications   Medication Sig Dispense Refill    aspirin 81 mg chewable tablet 1 tablet (81 mg).      atorvastatin (Lipitor) 80 mg tablet Take 1 tablet (80 mg) by mouth once daily at bedtime.      cholecalciferol (Vitamin D3) 50 MCG (2000 UT) tablet Take 1 tablet (50 mcg) by mouth once daily.      flecainide (Tambocor) 50 mg tablet Take 1 tablet (50 mg) by mouth 2 times a day.      Lactobacil 2/Bifido 11/S.therm (HIGH POTENCY PROBIOTIC ORAL) Take by mouth.      losartan (Cozaar) 50 mg tablet Take 1.5 tablets (75 mg) by mouth once daily.       No current facility-administered medications for this visit.   [4]   Patient Active Problem List  Diagnosis    HTN (hypertension)    Menopausal syndrome    Mixed hyperlipidemia    SVT (supraventricular tachycardia)    Vitamin D deficiency    Elevated liver enzymes    Medicare annual wellness visit, subsequent    Brittle nails    Osteopenia after menopause

## 2025-07-02 LAB
25(OH)D3+25(OH)D2 SERPL-MCNC: 68 NG/ML (ref 30–100)
ALBUMIN SERPL-MCNC: 4.3 G/DL (ref 3.6–5.1)
ALP SERPL-CCNC: 91 U/L (ref 37–153)
ALT SERPL-CCNC: 15 U/L (ref 6–29)
ANION GAP SERPL CALCULATED.4IONS-SCNC: 8 MMOL/L (CALC) (ref 7–17)
AST SERPL-CCNC: 19 U/L (ref 10–35)
BILIRUB SERPL-MCNC: 1.4 MG/DL (ref 0.2–1.2)
BUN SERPL-MCNC: 12 MG/DL (ref 7–25)
CALCIUM SERPL-MCNC: 9.7 MG/DL (ref 8.6–10.4)
CHLORIDE SERPL-SCNC: 103 MMOL/L (ref 98–110)
CHOLEST SERPL-MCNC: 116 MG/DL
CHOLEST/HDLC SERPL: 2.1 (CALC)
CO2 SERPL-SCNC: 28 MMOL/L (ref 20–32)
CREAT SERPL-MCNC: 0.86 MG/DL (ref 0.6–1)
EGFRCR SERPLBLD CKD-EPI 2021: 71 ML/MIN/1.73M2
ERYTHROCYTE [DISTWIDTH] IN BLOOD BY AUTOMATED COUNT: 12.4 % (ref 11–15)
EST. AVERAGE GLUCOSE BLD GHB EST-MCNC: 120 MG/DL
EST. AVERAGE GLUCOSE BLD GHB EST-SCNC: 6.6 MMOL/L
GLUCOSE SERPL-MCNC: 104 MG/DL (ref 65–99)
HBA1C MFR BLD: 5.8 %
HCT VFR BLD AUTO: 42.8 % (ref 35–45)
HCV AB SERPL QL IA: NORMAL
HDLC SERPL-MCNC: 55 MG/DL
HGB BLD-MCNC: 13.8 G/DL (ref 11.7–15.5)
LDLC SERPL CALC-MCNC: 46 MG/DL (CALC)
MAGNESIUM SERPL-MCNC: 2 MG/DL (ref 1.5–2.5)
MCH RBC QN AUTO: 27.8 PG (ref 27–33)
MCHC RBC AUTO-ENTMCNC: 32.2 G/DL (ref 32–36)
MCV RBC AUTO: 86.1 FL (ref 80–100)
NONHDLC SERPL-MCNC: 61 MG/DL (CALC)
PLATELET # BLD AUTO: 296 THOUSAND/UL (ref 140–400)
PMV BLD REES-ECKER: 9.8 FL (ref 7.5–12.5)
POTASSIUM SERPL-SCNC: 4.4 MMOL/L (ref 3.5–5.3)
PROT SERPL-MCNC: 6.9 G/DL (ref 6.1–8.1)
RBC # BLD AUTO: 4.97 MILLION/UL (ref 3.8–5.1)
SODIUM SERPL-SCNC: 139 MMOL/L (ref 135–146)
TRIGL SERPL-MCNC: 71 MG/DL
TSH SERPL-ACNC: 1.66 MIU/L (ref 0.4–4.5)
WBC # BLD AUTO: 5.4 THOUSAND/UL (ref 3.8–10.8)

## 2025-07-07 ENCOUNTER — HOSPITAL ENCOUNTER (OUTPATIENT)
Dept: RADIOLOGY | Facility: CLINIC | Age: 75
Discharge: HOME | End: 2025-07-07
Payer: MEDICARE

## 2025-07-07 VITALS — BODY MASS INDEX: 29.91 KG/M2 | WEIGHT: 191 LBS

## 2025-07-07 DIAGNOSIS — Z12.31 ENCOUNTER FOR SCREENING MAMMOGRAM FOR MALIGNANT NEOPLASM OF BREAST: ICD-10-CM

## 2025-07-07 PROCEDURE — 77067 SCR MAMMO BI INCL CAD: CPT

## 2025-07-07 PROCEDURE — 77067 SCR MAMMO BI INCL CAD: CPT | Performed by: STUDENT IN AN ORGANIZED HEALTH CARE EDUCATION/TRAINING PROGRAM

## 2025-07-07 PROCEDURE — 77063 BREAST TOMOSYNTHESIS BI: CPT | Performed by: STUDENT IN AN ORGANIZED HEALTH CARE EDUCATION/TRAINING PROGRAM

## 2025-07-08 DIAGNOSIS — R92.8 ABNORMAL MAMMOGRAM OF RIGHT BREAST: Primary | ICD-10-CM

## 2025-07-21 ENCOUNTER — APPOINTMENT (OUTPATIENT)
Dept: LAB | Facility: HOSPITAL | Age: 75
End: 2025-07-21
Payer: MEDICARE

## 2025-07-25 ENCOUNTER — HOSPITAL ENCOUNTER (OUTPATIENT)
Dept: RADIOLOGY | Facility: HOSPITAL | Age: 75
Discharge: HOME | End: 2025-07-25
Payer: MEDICARE

## 2025-07-25 DIAGNOSIS — R92.8 OTHER ABNORMAL AND INCONCLUSIVE FINDINGS ON DIAGNOSTIC IMAGING OF BREAST: ICD-10-CM

## 2025-07-25 PROCEDURE — 77061 BREAST TOMOSYNTHESIS UNI: CPT | Mod: RT

## 2025-07-25 PROCEDURE — 77065 DX MAMMO INCL CAD UNI: CPT | Mod: RIGHT SIDE | Performed by: RADIOLOGY

## 2025-07-25 PROCEDURE — G0279 TOMOSYNTHESIS, MAMMO: HCPCS | Mod: RIGHT SIDE | Performed by: RADIOLOGY

## 2025-12-22 ENCOUNTER — APPOINTMENT (OUTPATIENT)
Facility: CLINIC | Age: 75
End: 2025-12-22
Payer: MEDICARE